# Patient Record
Sex: FEMALE | Race: BLACK OR AFRICAN AMERICAN
[De-identification: names, ages, dates, MRNs, and addresses within clinical notes are randomized per-mention and may not be internally consistent; named-entity substitution may affect disease eponyms.]

---

## 2018-01-26 ENCOUNTER — HOSPITAL ENCOUNTER (OUTPATIENT)
Dept: HOSPITAL 62 - LAB | Age: 33
End: 2018-01-26
Attending: FAMILY MEDICINE
Payer: SELF-PAY

## 2018-01-26 DIAGNOSIS — N13.2: Primary | ICD-10-CM

## 2018-01-26 LAB
APPEARANCE UR: CLEAR
APTT PPP: (no result) S
BILIRUB UR QL STRIP: NEGATIVE
GLUCOSE UR STRIP-MCNC: NEGATIVE MG/DL
KETONES UR STRIP-MCNC: NEGATIVE MG/DL
NITRITE UR QL STRIP: NEGATIVE
PH UR STRIP: 6 [PH] (ref 5–9)
PROT UR STRIP-MCNC: NEGATIVE MG/DL
SP GR UR STRIP: 1
UROBILINOGEN UR-MCNC: NEGATIVE MG/DL (ref ?–2)

## 2018-01-26 PROCEDURE — 81001 URINALYSIS AUTO W/SCOPE: CPT

## 2019-08-23 ENCOUNTER — HOSPITAL ENCOUNTER (EMERGENCY)
Dept: HOSPITAL 62 - ER | Age: 34
Discharge: HOME | End: 2019-08-23
Payer: COMMERCIAL

## 2019-08-23 VITALS — DIASTOLIC BLOOD PRESSURE: 79 MMHG | SYSTOLIC BLOOD PRESSURE: 129 MMHG

## 2019-08-23 DIAGNOSIS — I10: ICD-10-CM

## 2019-08-23 DIAGNOSIS — R42: Primary | ICD-10-CM

## 2019-08-23 DIAGNOSIS — R61: ICD-10-CM

## 2019-08-23 DIAGNOSIS — R53.1: ICD-10-CM

## 2019-08-23 DIAGNOSIS — M79.7: ICD-10-CM

## 2019-08-23 LAB
ADD MANUAL DIFF: NO
ALBUMIN SERPL-MCNC: 4.6 G/DL (ref 3.5–5)
ALP SERPL-CCNC: 82 U/L (ref 38–126)
ANION GAP SERPL CALC-SCNC: 10 MMOL/L (ref 5–19)
APPEARANCE UR: (no result)
APTT PPP: YELLOW S
AST SERPL-CCNC: 65 U/L (ref 14–36)
BASOPHILS # BLD AUTO: 0 10^3/UL (ref 0–0.2)
BASOPHILS NFR BLD AUTO: 0.4 % (ref 0–2)
BILIRUB DIRECT SERPL-MCNC: 0.3 MG/DL (ref 0–0.4)
BILIRUB SERPL-MCNC: 0.6 MG/DL (ref 0.2–1.3)
BILIRUB UR QL STRIP: NEGATIVE
BUN SERPL-MCNC: 9 MG/DL (ref 7–20)
CALCIUM: 9.7 MG/DL (ref 8.4–10.2)
CHLORIDE SERPL-SCNC: 99 MMOL/L (ref 98–107)
CO2 SERPL-SCNC: 28 MMOL/L (ref 22–30)
EOSINOPHIL # BLD AUTO: 0 10^3/UL (ref 0–0.6)
EOSINOPHIL NFR BLD AUTO: 0.3 % (ref 0–6)
ERYTHROCYTE [DISTWIDTH] IN BLOOD BY AUTOMATED COUNT: 15.3 % (ref 11.5–14)
GLUCOSE SERPL-MCNC: 99 MG/DL (ref 75–110)
GLUCOSE UR STRIP-MCNC: NEGATIVE MG/DL
HCT VFR BLD CALC: 38.5 % (ref 36–47)
HGB BLD-MCNC: 12.7 G/DL (ref 12–15.5)
KETONES UR STRIP-MCNC: (no result) MG/DL
LYMPHOCYTES # BLD AUTO: 1 10^3/UL (ref 0.5–4.7)
LYMPHOCYTES NFR BLD AUTO: 9.5 % (ref 13–45)
MCH RBC QN AUTO: 29.2 PG (ref 27–33.4)
MCHC RBC AUTO-ENTMCNC: 33 G/DL (ref 32–36)
MCV RBC AUTO: 89 FL (ref 80–97)
MONOCYTES # BLD AUTO: 0.3 10^3/UL (ref 0.1–1.4)
MONOCYTES NFR BLD AUTO: 2.9 % (ref 3–13)
NEUTROPHILS # BLD AUTO: 9.5 10^3/UL (ref 1.7–8.2)
NEUTS SEG NFR BLD AUTO: 86.9 % (ref 42–78)
NITRITE UR QL STRIP: NEGATIVE
PH UR STRIP: 6 [PH] (ref 5–9)
PLATELET # BLD: 429 10^3/UL (ref 150–450)
POTASSIUM SERPL-SCNC: 3.8 MMOL/L (ref 3.6–5)
PROT SERPL-MCNC: 8 G/DL (ref 6.3–8.2)
PROT UR STRIP-MCNC: 100 MG/DL
RBC # BLD AUTO: 4.35 10^6/UL (ref 3.72–5.28)
SP GR UR STRIP: 1.02
TOTAL CELLS COUNTED % (AUTO): 100 %
UROBILINOGEN UR-MCNC: 2 MG/DL (ref ?–2)
WBC # BLD AUTO: 10.9 10^3/UL (ref 4–10.5)

## 2019-08-23 PROCEDURE — 99284 EMERGENCY DEPT VISIT MOD MDM: CPT

## 2019-08-23 PROCEDURE — 85025 COMPLETE CBC W/AUTO DIFF WBC: CPT

## 2019-08-23 PROCEDURE — S0119 ONDANSETRON 4 MG: HCPCS

## 2019-08-23 PROCEDURE — 96360 HYDRATION IV INFUSION INIT: CPT

## 2019-08-23 PROCEDURE — 84703 CHORIONIC GONADOTROPIN ASSAY: CPT

## 2019-08-23 PROCEDURE — 82962 GLUCOSE BLOOD TEST: CPT

## 2019-08-23 PROCEDURE — 93010 ELECTROCARDIOGRAM REPORT: CPT

## 2019-08-23 PROCEDURE — 36415 COLL VENOUS BLD VENIPUNCTURE: CPT

## 2019-08-23 PROCEDURE — 87086 URINE CULTURE/COLONY COUNT: CPT

## 2019-08-23 PROCEDURE — 81001 URINALYSIS AUTO W/SCOPE: CPT

## 2019-08-23 PROCEDURE — 93005 ELECTROCARDIOGRAM TRACING: CPT

## 2019-08-23 PROCEDURE — 80053 COMPREHEN METABOLIC PANEL: CPT

## 2019-08-23 NOTE — ER DOCUMENT REPORT
ED General





- General


Chief Complaint: Near Syncope


Stated Complaint: WEAKNESS


Time Seen by Provider: 08/23/19 11:49


Primary Care Provider: 


ZEE NAVARRO DO [Primary Care Provider] - Follow up as needed


Mode of Arrival: Ambulatory


Information source: Patient


Notes: 





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 

she was sitting down and started to feel little "sweaty" and lightheaded.  She 

states she has had this 3-4 times in the past.  She did not pass out.  No 

headache, neck pain, chest pain, palpitations, abdominal pain, weakness or 

numbness.  She does feel better now.  No vomiting or diarrhea.  Patient states 

that she has a past medical history as recorded including high blood pressure 

and fibromyalgia.  She denies missing any menstrual periods.  She is on blood 

pressure medications with no change to missed dosages recently.








ROS:  See HPI


All other review of systems reviewed and otherwise negative





Reviewed vital signs and nursing note as charted by RN.





PHYSICAL EXAM: 





CONSTITUTIONAL: Alert and oriented and responds appropriately to questions. 

Well-appearing; well-nourished





HEAD: Normocephalic; atraumatic





EYES: PERRL; no nystagmus





ENT: Normal nose; no rhinorrhea; moist mucous membranes; pharynx without lesions

noted





NECK: Supple without meningismus; non-tender; no cervical lymphadenopathy, no 

masses





CARD: Regular rate and rhythm; no murmurs; symmetric distal pulses





RESP: Normal chest excursion without splinting or tachypnea; breath sounds clear

and equal bilaterally; no wheezes, no rhonchi, no rales





ABD/GI: Normal bowel sounds; non-distended; soft, non-tender to deep palpation 

of all 4 quadrants of the abdomen





BACK: The back appears normal and is non-tender to palpation





EXT: Normal ROM in all joints; non-tender to palpation; no edema





SKIN: No acute lesions noted





NEURO: CN 2-12 intact; 5/5 bilateral upper and lower extremity strength with 

sensation intact to light touch





PSYCH: The patient's mood and manner are appropriate. Grooming and personal 

hygiene are appropriate.


TRAVEL OUTSIDE OF THE [place] IN LAST 30 DAYS: No





- Related Data


Allergies/Adverse Reactions: 


                                        





esomeprazole magnesium [From Nexium] Adverse Reaction (Verified 08/23/19 11:27)


   











Past Medical History





- General


Information source: Patient





- Social History


Smoking Status: Never Smoker


Chew tobacco use (# tins/day): No


Frequency of alcohol use: None


Drug Abuse: None


Family History: Reviewed & Not Pertinent


Patient has suicidal ideation: No


Patient has homicidal ideation: No


Neurological Medical History: Denies: Hx Seizures


Renal/ Medical History: Denies: Hx Peritoneal Dialysis


Musculoskeletal Medical History: Reports Hx Fibromyalgia


Past Surgical History: Reports: Hx Adenoidectomy, Hx Cholecystectomy.  Denies: 

Hx Hysterectomy





- Immunizations


Hx Diphtheria, Pertussis, Tetanus Vaccination: Yes





Physical Exam





- Vital signs


Vitals: 


                                        











Temp Pulse Resp BP Pulse Ox


 


 97.6 F   95   16   100/59 L  99 


 


 08/23/19 11:33  08/23/19 11:33  08/23/19 11:33  08/23/19 11:33  08/23/19 11:33














Course





- Re-evaluation


Re-evalutation: 





Given the above history and physical we will obtain basic labs, pregnancy test, 

place the patient on the monitor, obtain an EKG, and reassess.  I do not believe

that the patient requires any imaging at this particular moment.  Vital signs as

recorded.  Orthostatics are pending.





08/23/19 13:08


Labs thus far as recorded.





08/23/19 13:35


EKG shows a heart of 101, sinus tachycardia, normal axis, no obvious ST 

elevation or depression.  Flattened T waves throughout.





08/23/19 14:59


Labs and orthostatics as recorded.  No obvious signs of infection.  Patient 

still has no headache, chest pain, abdominal pain, weakness or numbness.  Fluid 

has been provided.





Given the above history and physical, I do believe an intracranial, 

intrathoracic, or intra-abdominal infectious or occlusive process to be unlikely

at this time.  No obvious signs of infection.  Patient will be discharged home 

at this time with strict return precautions and follow-up with the primary care 

provider.





- Vital Signs


Vital signs: 


                                        











Temp Pulse Resp BP Pulse Ox


 


 97.6 F   103 H  26 H  128/68 H  100 


 


 08/23/19 11:33  08/23/19 13:59  08/23/19 14:05  08/23/19 14:05  08/23/19 14:05














- Laboratory


Result Diagrams: 


                                 08/23/19 12:03





                                 08/23/19 12:03


Laboratory results interpreted by me: 


                                        











  08/23/19 08/23/19 08/23/19





  12:03 12:03 13:05


 


WBC  10.9 H  


 


RDW  15.3 H  


 


Lymph % (Auto)  9.5 L  


 


Mono % (Auto)  2.9 L  


 


Absolute Neuts (auto)  9.5 H  


 


Seg Neutrophils %  86.9 H  


 


AST   65 H 


 


Urine Protein    100 H


 


Urine Ketones    TRACE H


 


Urine Urobilinogen    2.0 H














Discharge





- Discharge


Clinical Impression: 


 Lightheadedness





Condition: Good


Disposition: HOME, SELF-CARE


Additional Instructions: 


Come back immediately with any return of lightheadedness or dizziness, headache,

chest pain, abdominal pain, weakness or numbness, or any other acute problems.  

Please make sure that you follow-up with the primary care physician as we kourtney davila.


Referrals: 


ZEE NAVARRO,  [Primary Care Provider] - Follow up as needed

## 2019-08-23 NOTE — ER DOCUMENT REPORT
ED Medical Screen (RME)





- General


Chief Complaint: Near Syncope


Stated Complaint: WEAKNESS


Time Seen by Provider: 08/23/19 11:49


Primary Care Provider: 


ZEE NAVARRO DO [Primary Care Provider] - Follow up as needed


Mode of Arrival: Ambulatory


Information source: Patient


Notes: 





34-year-old female presents to ED for complaint of feeling hot and sweating.  

She states that she had upset stomach had some diarrhea and had some nausea no 

vomiting.  She states she felt like she might black out.  Her blood pressure is 

a little lower than her normal and her pulse is lower than her normal.  We will 

get blood work give her some fluids and some nausea medicine and reassess in the

back by another provider.  Patient is alert oriented respirations regular and 

unlabored speaking in full sentences walks with a even steady gait.














I have greeted and performed a rapid initial assessment of this patient.  A 

comprehensive ED assessment and evaluation of the patient, analysis of test 

results and completion of medical decision making process will be conducted by 

an additional ED providers.


TRAVEL OUTSIDE OF THE [place] IN LAST 30 DAYS: No





- Related Data


Allergies/Adverse Reactions: 


                                        





esomeprazole magnesium [From Nexium] Adverse Reaction (Verified 08/23/19 11:27)


   











Past Medical History





- Social History


Chew tobacco use (# tins/day): No


Frequency of alcohol use: None


Drug Abuse: None


Neurological Medical History: Denies: Hx Seizures


Renal/ Medical History: Denies: Hx Peritoneal Dialysis


Musculoskeltal Medical History: Reports Hx Fibromyalgia


Past Surgical History: Reports: Hx Adenoidectomy, Hx Cholecystectomy.  Denies: 

Hx Hysterectomy





- Immunizations


Hx Diphtheria, Pertussis, Tetanus Vaccination: Yes





Physical Exam





- Vital signs


Vitals: 





                                        











Temp Pulse Resp BP Pulse Ox


 


 97.6 F   95   16   100/59 L  99 


 


 08/23/19 11:33  08/23/19 11:33  08/23/19 11:33  08/23/19 11:33  08/23/19 11:33














Course





- Vital Signs


Vital signs: 





                                        











Temp Pulse Resp BP Pulse Ox


 


 97.6 F   95   16   100/59 L  99 


 


 08/23/19 11:33  08/23/19 11:33  08/23/19 11:33  08/23/19 11:33  08/23/19 11:33














Doctor's Discharge





- Discharge


Referrals: 


RAMON,ZEE SHAUN, DO [Primary Care Provider] - Follow up as needed

## 2019-08-24 NOTE — EKG REPORT
SEVERITY:- OTHERWISE NORMAL ECG -

SINUS TACHYCARDIA

LATERAL Q WAVES, PROBABLY NORMAL VARIATION

:

Confirmed by: Crystal Groves MD 24-Aug-2019 19:11:46

## 2019-11-21 ENCOUNTER — HOSPITAL ENCOUNTER (OUTPATIENT)
Dept: HOSPITAL 62 - RAD | Age: 34
End: 2019-11-21
Payer: COMMERCIAL

## 2019-11-21 DIAGNOSIS — M54.2: Primary | ICD-10-CM

## 2019-11-21 DIAGNOSIS — M54.5: ICD-10-CM

## 2019-11-21 PROCEDURE — 72050 X-RAY EXAM NECK SPINE 4/5VWS: CPT

## 2019-11-21 PROCEDURE — 72110 X-RAY EXAM L-2 SPINE 4/>VWS: CPT

## 2019-11-21 NOTE — RADIOLOGY REPORT (SQ)
EXAM DESCRIPTION:  L SPINE WHOLE



COMPLETED DATE/TIME:  11/21/2019 3:27 pm



REASON FOR STUDY:  LOW BACK PAIN M54.2  CERVICALGIA M54.5  LOW BACK PAIN



COMPARISON:  None.



NUMBER OF VIEWS:  Five views including obliques.



TECHNIQUE:  AP, lateral, oblique, and sacral radiographic images acquired of the lumbar spine.



LIMITATIONS:  None.



FINDINGS:  MINERALIZATION: Normal.

SEGMENTATION: Normal.  No transitional anatomy.

ALIGNMENT: Normal.

VERTEBRAE: Maintained height.  No fracture or worrisome bone lesion.

DISCS: Preserved height.  No significant osteophytes or end plate irregularity.

POSTERIOR ELEMENTS: Pedicles and facets are intact.  No pars defect or posterior arch defects.

HARDWARE: None in the spine.

PARASPINAL SOFT TISSUES: Normal.

PELVIS: Intact as visualized. No fractures or worrisome bone lesions. SI joints intact.

OTHER: No other significant finding.



IMPRESSION:  NORMAL 5 VIEW LUMBAR SPINE.



TECHNICAL DOCUMENTATION:  JOB ID:  [id number]

 2011 [employer]- All Rights Reserved



Reading location - IP/workstation name: [email or web address]

## 2019-11-21 NOTE — RADIOLOGY REPORT (SQ)
EXAM DESCRIPTION:  CERV SP 4 OR 5 VIEWS



COMPLETED DATE/TIME:  11/21/2019 3:27 pm



REASON FOR STUDY:  CERVICALGIA M54.2  CERVICALGIA M54.5  LOW BACK PAIN



COMPARISON:  None.



NUMBER OF VIEWS:  Five views.



TECHNIQUE:  AP, lateral, obliques and odontoid radiographic images acquired of the cervical spine.



LIMITATIONS:  None.



FINDINGS:  MINERALIZATION: Normal.

ALIGNMENT: Anatomic.

VERTEBRAE: Vertebral bodies of normal height.

DISCS: No significant osteophytes or sclerosis. Disc height maintained.

FORAMINA: No osteophytes or foraminal narrowing.

LATERAL AND POSTERIOR ELEMENTS: Facets, lateral masses and spinous processes without significant find
ings.

HARDWARE: None in the spine.

SOFT TISSUES: No masses or calcifications. Lung apices clear.

OTHER: No other significant finding.



IMPRESSION:  NO SIGNIFICANT RADIOGRAPHIC FINDING IN THE CERVICAL SPINE.



TECHNICAL DOCUMENTATION:  JOB ID:  [id number]

 2011 [employer]- All Rights Reserved



Reading location - IP/workstation name: [email or web address]

## 2019-12-03 ENCOUNTER — HOSPITAL ENCOUNTER (EMERGENCY)
Dept: HOSPITAL 62 - ER | Age: 34
LOS: 1 days | Discharge: HOME | End: 2019-12-04
Payer: COMMERCIAL

## 2019-12-03 DIAGNOSIS — Z90.49: ICD-10-CM

## 2019-12-03 DIAGNOSIS — R53.1: ICD-10-CM

## 2019-12-03 DIAGNOSIS — R61: ICD-10-CM

## 2019-12-03 DIAGNOSIS — I10: ICD-10-CM

## 2019-12-03 DIAGNOSIS — R51: Primary | ICD-10-CM

## 2019-12-03 LAB
ADD MANUAL DIFF: NO
ALBUMIN SERPL-MCNC: 4.8 G/DL (ref 3.5–5)
ALP SERPL-CCNC: 76 U/L (ref 38–126)
ANION GAP SERPL CALC-SCNC: 11 MMOL/L (ref 5–19)
APPEARANCE UR: CLEAR
APTT PPP: (no result) S
AST SERPL-CCNC: 37 U/L (ref 14–36)
BASOPHILS # BLD AUTO: 0.1 10^3/UL (ref 0–0.2)
BASOPHILS NFR BLD AUTO: 0.5 % (ref 0–2)
BILIRUB DIRECT SERPL-MCNC: 0.1 MG/DL (ref 0–0.4)
BILIRUB SERPL-MCNC: 0.3 MG/DL (ref 0.2–1.3)
BILIRUB UR QL STRIP: NEGATIVE
BUN SERPL-MCNC: 7 MG/DL (ref 7–20)
CALCIUM: 10.1 MG/DL (ref 8.4–10.2)
CHLORIDE SERPL-SCNC: 102 MMOL/L (ref 98–107)
CO2 SERPL-SCNC: 27 MMOL/L (ref 22–30)
EOSINOPHIL # BLD AUTO: 0.1 10^3/UL (ref 0–0.6)
EOSINOPHIL NFR BLD AUTO: 0.6 % (ref 0–6)
ERYTHROCYTE [DISTWIDTH] IN BLOOD BY AUTOMATED COUNT: 15.9 % (ref 11.5–14)
GLUCOSE SERPL-MCNC: 100 MG/DL (ref 75–110)
GLUCOSE UR STRIP-MCNC: NEGATIVE MG/DL
HCT VFR BLD CALC: 43 % (ref 36–47)
HGB BLD-MCNC: 14.1 G/DL (ref 12–15.5)
KETONES UR STRIP-MCNC: NEGATIVE MG/DL
LYMPHOCYTES # BLD AUTO: 2.4 10^3/UL (ref 0.5–4.7)
LYMPHOCYTES NFR BLD AUTO: 25.6 % (ref 13–45)
MCH RBC QN AUTO: 29.2 PG (ref 27–33.4)
MCHC RBC AUTO-ENTMCNC: 32.7 G/DL (ref 32–36)
MCV RBC AUTO: 89 FL (ref 80–97)
MONOCYTES # BLD AUTO: 0.5 10^3/UL (ref 0.1–1.4)
MONOCYTES NFR BLD AUTO: 5.6 % (ref 3–13)
NEUTROPHILS # BLD AUTO: 6.4 10^3/UL (ref 1.7–8.2)
NEUTS SEG NFR BLD AUTO: 67.7 % (ref 42–78)
NITRITE UR QL STRIP: NEGATIVE
PH UR STRIP: 7 [PH] (ref 5–9)
PLATELET # BLD: 370 10^3/UL (ref 150–450)
POTASSIUM SERPL-SCNC: 4.5 MMOL/L (ref 3.6–5)
PROT SERPL-MCNC: 8.8 G/DL (ref 6.3–8.2)
PROT UR STRIP-MCNC: NEGATIVE MG/DL
RBC # BLD AUTO: 4.82 10^6/UL (ref 3.72–5.28)
SP GR UR STRIP: 1
TOTAL CELLS COUNTED % (AUTO): 100 %
UROBILINOGEN UR-MCNC: NEGATIVE MG/DL (ref ?–2)
WBC # BLD AUTO: 9.5 10^3/UL (ref 4–10.5)

## 2019-12-03 PROCEDURE — 85025 COMPLETE CBC W/AUTO DIFF WBC: CPT

## 2019-12-03 PROCEDURE — 81001 URINALYSIS AUTO W/SCOPE: CPT

## 2019-12-03 PROCEDURE — 84703 CHORIONIC GONADOTROPIN ASSAY: CPT

## 2019-12-03 PROCEDURE — 80053 COMPREHEN METABOLIC PANEL: CPT

## 2019-12-03 PROCEDURE — 36415 COLL VENOUS BLD VENIPUNCTURE: CPT

## 2019-12-03 PROCEDURE — 99284 EMERGENCY DEPT VISIT MOD MDM: CPT

## 2019-12-03 NOTE — ER DOCUMENT REPORT
ED Medical Screen (RME)





- General


Stated Complaint: WEAKNESS


Time Seen by Provider: 12/03/19 22:22


Primary Care Provider: 


ZEE NAVARRO DO [Primary Care Provider] - Follow up as needed


Notes: 





Patient is a 34-year-old female who presents emergency department with a chief 

complaint of weakness, frequent urination, and frequent thirst.  She states that

her symptoms started after lunchtime.  Patient states that this has never 

happened before.  She denies any dysuria.  Last menstrual cycle was about a 

month ago and patient states that she should not have any time soon.  Denies 

abdominal pain, cough, or other symptoms.





Exam: Soft, nontender abdomen.





I have greeted and performed a rapid initial assessment of this patient.  A 

comprehensive ED assessment and evaluation of the patient, analysis of test 

results and completion of medical decision making process will be conducted by 

an additional ED providers.


TRAVEL OUTSIDE OF THE [place] IN LAST 30 DAYS: No





- Related Data


Allergies/Adverse Reactions: 


                                        





esomeprazole magnesium [From Nexium] Adverse Reaction (Verified 08/23/19 11:27)


   











Past Medical History


Neurological Medical History: Denies: Hx Seizures


Renal/ Medical History: Denies: Hx Peritoneal Dialysis


Musculoskeltal Medical History: Reports Hx Fibromyalgia


Past Surgical History: Reports: Hx Adenoidectomy, Hx Cholecystectomy.  Denies: 

Hx Hysterectomy





- Immunizations


Hx Diphtheria, Pertussis, Tetanus Vaccination: Yes





Physical Exam





- Vital signs


Vitals: 





                                        











Temp Pulse Resp BP Pulse Ox


 


 98.4 F   101 H  16   137/81 H  98 


 


 12/03/19 20:53  12/03/19 20:53  12/03/19 20:53  12/03/19 20:53  12/03/19 20:53














Course





- Vital Signs


Vital signs: 





                                        











Temp Pulse Resp BP Pulse Ox


 


 98.4 F   101 H  16   137/81 H  98 


 


 12/03/19 20:53  12/03/19 20:53  12/03/19 20:53  12/03/19 20:53  12/03/19 20:53














Doctor's Discharge





- Discharge


Referrals: 


ZEE NAVARRO DO [Primary Care Provider] - Follow up as needed

## 2019-12-04 VITALS — DIASTOLIC BLOOD PRESSURE: 59 MMHG | SYSTOLIC BLOOD PRESSURE: 117 MMHG

## 2020-12-31 ENCOUNTER — HOSPITAL ENCOUNTER (OUTPATIENT)
Dept: HOSPITAL 62 - EMPHEALTH | Age: 35
End: 2020-12-31
Attending: INTERNAL MEDICINE
Payer: COMMERCIAL

## 2020-12-31 DIAGNOSIS — Z23: Primary | ICD-10-CM

## 2020-12-31 PROCEDURE — 91300: CPT

## 2021-01-21 ENCOUNTER — HOSPITAL ENCOUNTER (OUTPATIENT)
Dept: HOSPITAL 62 - EMPHEALTH | Age: 36
End: 2021-01-21
Attending: INTERNAL MEDICINE
Payer: COMMERCIAL

## 2021-01-21 DIAGNOSIS — Z23: Primary | ICD-10-CM

## 2021-01-21 PROCEDURE — 91300: CPT
